# Patient Record
Sex: FEMALE | Employment: OTHER | URBAN - METROPOLITAN AREA
[De-identification: names, ages, dates, MRNs, and addresses within clinical notes are randomized per-mention and may not be internally consistent; named-entity substitution may affect disease eponyms.]

---

## 2024-07-03 ENCOUNTER — OFFICE VISIT (OUTPATIENT)
Dept: OPHTHALMOLOGY | Facility: CLINIC | Age: 69
End: 2024-07-03
Attending: OPHTHALMOLOGY
Payer: MEDICARE

## 2024-07-03 DIAGNOSIS — H52.13 MYOPIA OF BOTH EYES: ICD-10-CM

## 2024-07-03 DIAGNOSIS — H25.813 COMBINED FORMS OF AGE-RELATED CATARACT OF BOTH EYES: Primary | ICD-10-CM

## 2024-07-03 DIAGNOSIS — H52.4 PRESBYOPIA: ICD-10-CM

## 2024-07-03 PROCEDURE — 76512 OPH US DX B-SCAN: CPT | Performed by: OPHTHALMOLOGY

## 2024-07-03 PROCEDURE — G0463 HOSPITAL OUTPT CLINIC VISIT: HCPCS | Performed by: OPHTHALMOLOGY

## 2024-07-03 PROCEDURE — 76519 ECHO EXAM OF EYE: CPT | Performed by: OPHTHALMOLOGY

## 2024-07-03 PROCEDURE — 99204 OFFICE O/P NEW MOD 45 MIN: CPT | Performed by: OPHTHALMOLOGY

## 2024-07-03 RX ORDER — DIPHENHYDRAMINE HCL 25 MG
CAPSULE ORAL
COMMUNITY
End: 2024-09-05

## 2024-07-03 RX ORDER — OMEGA-3/DHA/EPA/FISH OIL 60 MG-90MG
CAPSULE ORAL
COMMUNITY

## 2024-07-03 RX ORDER — ASPIRIN 325 MG
TABLET ORAL
COMMUNITY

## 2024-07-03 RX ORDER — IBUPROFEN 200 MG
TABLET ORAL
COMMUNITY

## 2024-07-03 ASSESSMENT — CONF VISUAL FIELD
OS_INFERIOR_TEMPORAL_RESTRICTION: 3
OS_SUPERIOR_TEMPORAL_RESTRICTION: 3
METHOD: COUNTING FINGERS
OD_NORMAL: 1
OS_SUPERIOR_NASAL_RESTRICTION: 3
OD_INFERIOR_TEMPORAL_RESTRICTION: 0
OD_SUPERIOR_TEMPORAL_RESTRICTION: 0
OS_INFERIOR_NASAL_RESTRICTION: 3
OD_SUPERIOR_NASAL_RESTRICTION: 0
OD_INFERIOR_NASAL_RESTRICTION: 0

## 2024-07-03 ASSESSMENT — REFRACTION_WEARINGRX
SPECS_TYPE: BIFOCAL
OS_SPHERE: +2.00
OS_AXIS: 051
OD_CYLINDER: +0.25
OD_SPHERE: +1.25
OS_CYLINDER: +0.25
OD_ADD: +2.75
OS_ADD: +2.75
OD_AXIS: 154

## 2024-07-03 ASSESSMENT — REFRACTION_MANIFEST
OS_ADD: +2.75
OS_SPHERE: PLANO
OD_SPHERE: -0.75
OS_CYLINDER: SPHERE
OD_ADD: +2.75
OD_CYLINDER: SPHERE

## 2024-07-03 ASSESSMENT — VISUAL ACUITY
OD_BAT_HIGH: <20/400
OS_BAT_HIGH: <20/400
OS_SC: CF @ 1'
OD_SC: 20/40
OD_SC+: -2
METHOD: SNELLEN - LINEAR

## 2024-07-03 ASSESSMENT — SLIT LAMP EXAM - LIDS
COMMENTS: NORMAL
COMMENTS: NORMAL

## 2024-07-03 ASSESSMENT — TONOMETRY
OD_IOP_MMHG: 19
OS_IOP_MMHG: 18
IOP_METHOD: ICARE

## 2024-07-03 ASSESSMENT — CUP TO DISC RATIO: OD_RATIO: 0.5

## 2024-07-03 NOTE — PROGRESS NOTES
HPI       Cataract Evaluation    In both eyes.  Associated symptoms include blurred vision, glare and a need for brighter lights.  Onset was gradual.  Context:  reading, night driving and dim lighting.  Since onset it is gradually worsening.  Treatments tried include artificial tears.             Comments    Here for cataracts evaluation. Last eye exam was about 1.5 years ago. VA has worsened since exam in both eyes, left>right - cloudy and hazy vision. Glare and night driving is bothersome. Uses AT as needed. No flashes or floaters.    Norberto Iyer COT 8:16 AM July 3, 2024     No ocular sx          Last edited by Norberto Iyer on 7/3/2024  8:16 AM.         Review of systems for the eyes was negative other than the pertinent positives/negatives listed in the HPI.      Assessment & Plan    HPI:  Elaina Geiger is a 69 year old female with no significant past medical history presents with decreased vision left eye x at least 2 years, left eye > right eye. She was told that has cataracts from [facility]. She notes difficulty with night driving and glare.       POHx: denies  PMHx: denies  Current Medications:   Current Outpatient Medications   Medication Sig Dispense Refill    aspirin (ASA) 325 MG tablet take 2 tablets (650 mg) by oral route every 6 hours as needed      diphenhydrAMINE (BENADRYL ALLERGY) 25 MG capsule take 1 capsule (25 mg) by oral route every 4 hours as needed      fish oil-omega-3 fatty acids 500 MG capsule       ibuprofen (ADVIL) 200 MG tablet take 1 tablet (200 mg) by oral route every 6 hours as needed with food       No current facility-administered medications for this visit.     FHx: denies family history of ocular conditions   PSHx: denies history of ocular surgeries       Current Eye Medications:      Assessment & Plan:  (H25.813) Combined forms of age-related cataract of both eyes    Special equipment/needs:  Eye: left  Anesthesia:topical  Dilates to: 6  Iris expansion:  Unlikely   Pseudoexfoliation:  No  Trypan Blue: Yes  Trauma: No    Able lay to flat: Yes  Blood Thinner: Yes ASA 81  Tamsulosin: No  DM: No  Guttae: No    Dominant Eye: right    Plan: [place] both eyes   Cataract is believed to be significantly contributing to patient's visual impairment. Cataract surgery recommended and expected to improve vision. Vision is not correctable by glasses or other nonsurgical measures. R/B/A were discussed with the patient. These included, but are not limited to: bleeding, infection, loss of vision, loss of the eye, need for more surgery, glaucoma, retinal detachment, need for glasses, corneal edema. The patient voiced understanding and wishes to proceed. All lens options were discussed with the patient including monofocal lenses, multifocal lenses, and toric lenses. The risks and benefits of each were discussed, including halos, glare, and possible need for glasses. No guarantees about vision after surgery were given. The patient voiced understanding and wishes to proceed    Proceed with CE/IOL left eye followed by right eye .  Resident involvement discussed  Possible MSICS    Deep set orbit    (H52.13) Myopia of both eyes  (H52.4) Presbyopia  Hold pending Cataract extraction.intraocular lens       Return for POD0.        Jah Waite MD     Attending Physician Attestation:  Complete documentation of historical and exam elements from today's encounter can be found in the full encounter summary report (not reduplicated in this progress note).  I personally obtained the chief complaint(s) and history of present illness.  I confirmed and edited as necessary the review of systems, past medical/surgical history, family history, social history, and examination findings as documented by others; and I examined the patient myself.  I personally reviewed the relevant tests, images, and reports as documented above.  I formulated and edited as necessary the assessment and plan and discussed the findings and management plan  with the patient and family. - Jah Waite MD

## 2024-07-05 ENCOUNTER — TELEPHONE (OUTPATIENT)
Dept: OPHTHALMOLOGY | Facility: CLINIC | Age: 69
End: 2024-07-05
Payer: MEDICARE

## 2024-07-05 PROBLEM — H25.813 COMBINED FORMS OF AGE-RELATED CATARACT OF BOTH EYES: Status: ACTIVE | Noted: 2024-07-03

## 2024-07-05 NOTE — TELEPHONE ENCOUNTER
Called patient to schedule surgery with     Spoke with: Elaina    Date of Surgery: 9/5 (Right) and 9/19 (Left)     Patient aware of approximate arrival time: No aware RN will reach out to discuss     Location of surgery: [facility] (Right[facility] and [facility] (Left)     Pre-Op H&P: [facility] in [facility]     Informed patient that they need to call to schedule pre-op H&P with PCP within 30 days of surgery date: Yes    Post-Op Appt Dates:     9/11 900  9/25 1015  10/16 900     Discussed with patient pre-op RN will call 2-3 days prior to surgery with arrival time and instructions:  Yes    Discussed with patient PAC RN will provide arrival time and instructions for surgery at the time of the appointment: [[place] locations only]: No      Standard Surgery Packet Sent: Yes 07/05/24  via Mail - Standard      Surgical Soap Discussed with Patient: Yes  - Received:  Local Pharmacy       Additional Information Sent in Packet: Post-op Appointment Itinerary      Informed patient that they will need an adult  to bring patient home from surgery: Yes  : Rosemary         Additional Comments:        All patients questions were answered and was instructed to review surgical packet and call back [phone] with any questions or concerns.       Anna C. Schoenecker on 7/5/2024 at 8:22 AM

## 2024-07-09 NOTE — TELEPHONE ENCOUNTER
Patient called and stated that her pre op physical is scheduled for 8/26 at [facility].  Cynthia Delgado on 7/9/2024 at 8:52 AM

## 2024-08-19 NOTE — TELEPHONE ENCOUNTER
Patient called in, they have not rec'd their packet yet. Writer apologized and let them know that they would send a packet to them today. Writer confirmed address and sent packet. Felisa Mosqueda on 8/19/2024 at 8:15 AM

## 2024-09-05 ENCOUNTER — OFFICE VISIT (OUTPATIENT)
Dept: OPHTHALMOLOGY | Facility: CLINIC | Age: 69
End: 2024-09-05

## 2024-09-05 ENCOUNTER — HOSPITAL ENCOUNTER (OUTPATIENT)
Facility: AMBULATORY SURGERY CENTER | Age: 69
Discharge: HOME | End: 2024-09-05
Attending: OPHTHALMOLOGY | Admitting: OPHTHALMOLOGY
Payer: MEDICARE

## 2024-09-05 VITALS
RESPIRATION RATE: 16 BRPM | HEART RATE: 71 BPM | TEMPERATURE: 97.6 F | BODY MASS INDEX: 21.6 KG/M2 | OXYGEN SATURATION: 95 % | HEIGHT: 60 IN | DIASTOLIC BLOOD PRESSURE: 85 MMHG | SYSTOLIC BLOOD PRESSURE: 141 MMHG | WEIGHT: 110 LBS

## 2024-09-05 DIAGNOSIS — Z98.890 POSTOPERATIVE EYE STATE: Primary | ICD-10-CM

## 2024-09-05 DIAGNOSIS — H25.812 COMBINED FORM OF AGE-RELATED CATARACT, LEFT EYE: Primary | ICD-10-CM

## 2024-09-05 PROCEDURE — 66984 XCAPSL CTRC RMVL W/O ECP: CPT | Mod: LT

## 2024-09-05 PROCEDURE — 99024 POSTOP FOLLOW-UP VISIT: CPT | Mod: GC | Performed by: OPHTHALMOLOGY

## 2024-09-05 PROCEDURE — 66984 XCAPSL CTRC RMVL W/O ECP: CPT | Mod: LT | Performed by: OPHTHALMOLOGY

## 2024-09-05 DEVICE — LENS CC60WF 22.0 CLAREON UV ASPHERIC BICONVEX IOL: Type: IMPLANTABLE DEVICE | Site: EYE | Status: FUNCTIONAL

## 2024-09-05 RX ORDER — TETRACAINE HYDROCHLORIDE 5 MG/ML
SOLUTION OPHTHALMIC PRN
Status: DISCONTINUED | OUTPATIENT
Start: 2024-09-05 | End: 2024-09-05 | Stop reason: HOSPADM

## 2024-09-05 RX ORDER — ACETAZOLAMIDE 500 MG/5ML
500 INJECTION, POWDER, LYOPHILIZED, FOR SOLUTION INTRAVENOUS ONCE
Status: COMPLETED | OUTPATIENT
Start: 2024-09-05 | End: 2024-09-05

## 2024-09-05 RX ORDER — DEXAMETHASONE SODIUM PHOSPHATE 10 MG/ML
4 INJECTION, SOLUTION INTRAMUSCULAR; INTRAVENOUS
Status: DISCONTINUED | OUTPATIENT
Start: 2024-09-05 | End: 2024-09-06 | Stop reason: HOSPADM

## 2024-09-05 RX ORDER — PROPARACAINE HYDROCHLORIDE 5 MG/ML
1 SOLUTION/ DROPS OPHTHALMIC ONCE
Status: COMPLETED | OUTPATIENT
Start: 2024-09-05 | End: 2024-09-05

## 2024-09-05 RX ORDER — DICLOFENAC SODIUM 1 MG/ML
1 SOLUTION/ DROPS OPHTHALMIC
Status: COMPLETED | OUTPATIENT
Start: 2024-09-05 | End: 2024-09-05

## 2024-09-05 RX ORDER — ONDANSETRON 4 MG/1
4 TABLET, ORALLY DISINTEGRATING ORAL EVERY 30 MIN PRN
Status: DISCONTINUED | OUTPATIENT
Start: 2024-09-05 | End: 2024-09-06 | Stop reason: HOSPADM

## 2024-09-05 RX ORDER — FENTANYL CITRATE 50 UG/ML
25 INJECTION, SOLUTION INTRAMUSCULAR; INTRAVENOUS EVERY 5 MIN PRN
Status: DISCONTINUED | OUTPATIENT
Start: 2024-09-05 | End: 2024-09-06 | Stop reason: HOSPADM

## 2024-09-05 RX ORDER — FENTANYL CITRATE 50 UG/ML
50 INJECTION, SOLUTION INTRAMUSCULAR; INTRAVENOUS EVERY 5 MIN PRN
Status: DISCONTINUED | OUTPATIENT
Start: 2024-09-05 | End: 2024-09-06 | Stop reason: HOSPADM

## 2024-09-05 RX ORDER — PROPARACAINE HYDROCHLORIDE 5 MG/ML
1 SOLUTION/ DROPS OPHTHALMIC
Status: DISCONTINUED | OUTPATIENT
Start: 2024-09-05 | End: 2024-09-06 | Stop reason: HOSPADM

## 2024-09-05 RX ORDER — SODIUM CHLORIDE, SODIUM LACTATE, POTASSIUM CHLORIDE, CALCIUM CHLORIDE 600; 310; 30; 20 MG/100ML; MG/100ML; MG/100ML; MG/100ML
INJECTION, SOLUTION INTRAVENOUS CONTINUOUS
Status: CANCELLED | OUTPATIENT
Start: 2024-09-05

## 2024-09-05 RX ORDER — ONDANSETRON 2 MG/ML
4 INJECTION INTRAMUSCULAR; INTRAVENOUS EVERY 30 MIN PRN
Status: DISCONTINUED | OUTPATIENT
Start: 2024-09-05 | End: 2024-09-06 | Stop reason: HOSPADM

## 2024-09-05 RX ORDER — MOXIFLOXACIN 5 MG/ML
1 SOLUTION/ DROPS OPHTHALMIC
Status: COMPLETED | OUTPATIENT
Start: 2024-09-05 | End: 2024-09-05

## 2024-09-05 RX ORDER — CYCLOPENTOLAT/TROPIC/PHENYLEPH 1%-1%-2.5%
1 DROPS (EA) OPHTHALMIC (EYE)
Status: COMPLETED | OUTPATIENT
Start: 2024-09-05 | End: 2024-09-05

## 2024-09-05 RX ORDER — TIMOLOL MALEATE 5 MG/ML
SOLUTION/ DROPS OPHTHALMIC PRN
Status: DISCONTINUED | OUTPATIENT
Start: 2024-09-05 | End: 2024-09-05 | Stop reason: HOSPADM

## 2024-09-05 RX ORDER — OXYCODONE HYDROCHLORIDE 5 MG/1
10 TABLET ORAL
Status: DISCONTINUED | OUTPATIENT
Start: 2024-09-05 | End: 2024-09-06 | Stop reason: HOSPADM

## 2024-09-05 RX ORDER — LIDOCAINE HYDROCHLORIDE 10 MG/ML
INJECTION, SOLUTION EPIDURAL; INFILTRATION; INTRACAUDAL; PERINEURAL PRN
Status: DISCONTINUED | OUTPATIENT
Start: 2024-09-05 | End: 2024-09-05 | Stop reason: HOSPADM

## 2024-09-05 RX ORDER — NALOXONE HYDROCHLORIDE 0.4 MG/ML
0.1 INJECTION, SOLUTION INTRAMUSCULAR; INTRAVENOUS; SUBCUTANEOUS
Status: DISCONTINUED | OUTPATIENT
Start: 2024-09-05 | End: 2024-09-06 | Stop reason: HOSPADM

## 2024-09-05 RX ORDER — LABETALOL HYDROCHLORIDE 5 MG/ML
10 INJECTION, SOLUTION INTRAVENOUS
Status: DISCONTINUED | OUTPATIENT
Start: 2024-09-05 | End: 2024-09-06 | Stop reason: HOSPADM

## 2024-09-05 RX ORDER — HYDROMORPHONE HYDROCHLORIDE 1 MG/ML
0.2 INJECTION, SOLUTION INTRAMUSCULAR; INTRAVENOUS; SUBCUTANEOUS EVERY 5 MIN PRN
Status: DISCONTINUED | OUTPATIENT
Start: 2024-09-05 | End: 2024-09-06 | Stop reason: HOSPADM

## 2024-09-05 RX ORDER — SODIUM CHLORIDE, SODIUM LACTATE, POTASSIUM CHLORIDE, CALCIUM CHLORIDE 600; 310; 30; 20 MG/100ML; MG/100ML; MG/100ML; MG/100ML
INJECTION, SOLUTION INTRAVENOUS CONTINUOUS
Status: DISCONTINUED | OUTPATIENT
Start: 2024-09-05 | End: 2024-09-06 | Stop reason: HOSPADM

## 2024-09-05 RX ORDER — BALANCED SALT SOLUTION 6.4; .75; .48; .3; 3.9; 1.7 MG/ML; MG/ML; MG/ML; MG/ML; MG/ML; MG/ML
SOLUTION OPHTHALMIC PRN
Status: DISCONTINUED | OUTPATIENT
Start: 2024-09-05 | End: 2024-09-05 | Stop reason: HOSPADM

## 2024-09-05 RX ORDER — HYDROMORPHONE HYDROCHLORIDE 1 MG/ML
0.4 INJECTION, SOLUTION INTRAMUSCULAR; INTRAVENOUS; SUBCUTANEOUS EVERY 5 MIN PRN
Status: DISCONTINUED | OUTPATIENT
Start: 2024-09-05 | End: 2024-09-06 | Stop reason: HOSPADM

## 2024-09-05 RX ORDER — MOXIFLOXACIN 5 MG/ML
1 SOLUTION/ DROPS OPHTHALMIC 4 TIMES DAILY
Qty: 3 ML | Refills: 0 | Status: SHIPPED | OUTPATIENT
Start: 2024-09-05 | End: 2024-09-05

## 2024-09-05 RX ORDER — OFLOXACIN 3 MG/ML
1-2 SOLUTION/ DROPS OPHTHALMIC 4 TIMES DAILY
Qty: 5 ML | Refills: 0 | Status: SHIPPED | OUTPATIENT
Start: 2024-09-05

## 2024-09-05 RX ORDER — MOXIFLOXACIN IN NACL,ISO-OS/PF 0.3MG/0.3
SYRINGE (ML) INTRAOCULAR PRN
Status: DISCONTINUED | OUTPATIENT
Start: 2024-09-05 | End: 2024-09-05 | Stop reason: HOSPADM

## 2024-09-05 RX ORDER — PREDNISOLONE ACETATE 10 MG/ML
1-2 SUSPENSION/ DROPS OPHTHALMIC 4 TIMES DAILY
Qty: 10 ML | Refills: 0 | Status: SHIPPED | OUTPATIENT
Start: 2024-09-05

## 2024-09-05 RX ORDER — KETOROLAC TROMETHAMINE 5 MG/ML
1 SOLUTION OPHTHALMIC 4 TIMES DAILY
Qty: 10 ML | Refills: 0 | Status: SHIPPED | OUTPATIENT
Start: 2024-09-05

## 2024-09-05 RX ORDER — ACETAMINOPHEN 325 MG/1
975 TABLET ORAL ONCE
Status: COMPLETED | OUTPATIENT
Start: 2024-09-05 | End: 2024-09-05

## 2024-09-05 RX ORDER — LIDOCAINE 40 MG/G
CREAM TOPICAL
Status: DISCONTINUED | OUTPATIENT
Start: 2024-09-05 | End: 2024-09-06 | Stop reason: HOSPADM

## 2024-09-05 RX ORDER — OXYCODONE HYDROCHLORIDE 5 MG/1
5 TABLET ORAL
Status: DISCONTINUED | OUTPATIENT
Start: 2024-09-05 | End: 2024-09-06 | Stop reason: HOSPADM

## 2024-09-05 RX ADMIN — ACETAZOLAMIDE 500 MG: 500 INJECTION, POWDER, LYOPHILIZED, FOR SOLUTION INTRAVENOUS at 09:53

## 2024-09-05 RX ADMIN — ACETAMINOPHEN 975 MG: 325 TABLET ORAL at 09:44

## 2024-09-05 RX ADMIN — DICLOFENAC SODIUM 1 DROP: 1 SOLUTION/ DROPS OPHTHALMIC at 09:43

## 2024-09-05 RX ADMIN — MOXIFLOXACIN 1 DROP: 5 SOLUTION/ DROPS OPHTHALMIC at 09:43

## 2024-09-05 RX ADMIN — DICLOFENAC SODIUM 1 DROP: 1 SOLUTION/ DROPS OPHTHALMIC at 09:27

## 2024-09-05 RX ADMIN — DICLOFENAC SODIUM 1 DROP: 1 SOLUTION/ DROPS OPHTHALMIC at 09:34

## 2024-09-05 RX ADMIN — SODIUM CHLORIDE, SODIUM LACTATE, POTASSIUM CHLORIDE, CALCIUM CHLORIDE: 600; 310; 30; 20 INJECTION, SOLUTION INTRAVENOUS at 09:45

## 2024-09-05 RX ADMIN — MOXIFLOXACIN 1 DROP: 5 SOLUTION/ DROPS OPHTHALMIC at 09:27

## 2024-09-05 RX ADMIN — Medication 1 DROP: at 09:34

## 2024-09-05 RX ADMIN — Medication 1 DROP: at 09:27

## 2024-09-05 RX ADMIN — MOXIFLOXACIN 1 DROP: 5 SOLUTION/ DROPS OPHTHALMIC at 09:34

## 2024-09-05 RX ADMIN — Medication 1 DROP: at 09:43

## 2024-09-05 RX ADMIN — PROPARACAINE HYDROCHLORIDE 1 DROP: 5 SOLUTION/ DROPS OPHTHALMIC at 09:27

## 2024-09-05 ASSESSMENT — EXTERNAL EXAM - LEFT EYE: OS_EXAM: NORMAL

## 2024-09-05 ASSESSMENT — TONOMETRY
IOP_METHOD: TONOPEN
OS_IOP_MMHG: 12

## 2024-09-05 ASSESSMENT — VISUAL ACUITY
OS_SC: 20/50
METHOD: SNELLEN - LINEAR

## 2024-09-05 ASSESSMENT — SLIT LAMP EXAM - LIDS: COMMENTS: NORMAL

## 2024-09-05 NOTE — DISCHARGE INSTRUCTIONS
"[facility]  Home Care Following Your Procedure  Call a doctor if you have signs of infection (fever, growing tenderness at the surgery site, a large amount of drainage or bleeding, severe pain, foul-smelling drainage, redness, swelling).  Today you received a Marcaine or bupivacaine block to numb the nerves near your surgery site.  This is a block using local anesthetic or \"numbing\" medication injected around the nerves to anesthetize or \"numb\" the area supplied by those nerves.  This block is injected into the muscle layer near your surgical site.  The medication may numb the location where you had surgery for 6-18 hours, but may last up to 24 hours.  If your surgical site is an arm or leg you should be careful with your affected limb, since it is possible to injure your limb without being aware of it due to the numbing.  Until full feeling returns, you should guard against bumping or hitting your limb, and avoid extreme hot or cold temperatures on the skin.  As the block wears off, the feeling will return as a tingling or prickly sensation near your surgical site.  You will experience more discomfort from your incision as the feeling returns.  You may want to take a pain pill (a narcotic or Tylenol if this was prescribed by your surgeon) when you start to experience mild pain before the pain becomes more severe.  If your pain medications do not control your pain you should notifiy your surgeon.        Tylenol/Acetaminophen Consumption    If you feel your pain relief is insufficient, you may take Tylenol/Acetaminophen in addition to your narcotic pain medication.   Be careful not to exceed 4,000 mg of Tylenol/Acetaminophen in a 24 hour period from all sources.  If you are taking extra strength Tylenol/acetaminophen (500 mg), the maximum dose is 8 tablets in 24 hours.  If you are taking regular strength acetaminophen (325 mg), the maximum dose is 12 tablets in 24 " hours.      Your doctor is:  Dr. Jah Waite, Ophthalmology: [phone]             Or dial [phone] and ask for the resident on call for:  Ophthalmology  For emergency care, call the:  [place]:  [phone] (TTY for hearing impaired: [phone])

## 2024-09-05 NOTE — OP NOTE
PREOPERATIVE DIAGNOSIS:   1. Combined form of age-related cataract, left eye     Left eye   POSTOPERATIVE DIAGNOSIS: Same   PROCEDURES:   1. Cataract extraction with intraocular lens implant Left eye.  SURGEON: Jah Waite M.D.  ASSISTANT: Marc Montemayor MD  INDICATIONS: The patient Elaina Geiger presented to the eye clinic with decreased vision secondary to cataract in the Left eye. The risks, benefits and alternatives to cataract extraction were discussed. The patient elected to proceed. All questions were answered to the patient's satisfaction.   DESCRIPTION OF PROCEDURE:   Prior to the procedure, appropriate cardiac and respiratory monitors were applied to the patient.  The patient was brought to the operating room. A drop of topical tetracaine was placed in the operative eye.  With adequate anesthesia, the Left eye was prepped and draped in the usual sterile fashion. A lid speculum was placed, and the operating microscope was rotated into position. A surgical pause was carried out to identify with all members of the surgical team the correct surgical site. A paracentesis was created.  Through this limbal paracentesis, the anterior chamber was filled with preservative-free lidocaine followed by Trypan for 30 seconds and rinsed with balanced salt solution. The eye was viscoelastic.  A temporal wound was created at the limbus using a 2.6 mm blade. A capsulorrhexis was initiated using a bent 25-gauge needle and was completed in continuous and circular fashion using the capsulorrhexis forceps. The lens nucleus was hydrodissected using balanced salt solution.  The lens nucleus was rotated and removed using phacoemulsification in a stop and chop technique.  Residual cortical material was removed using irrigation-aspiration.  The capsular bag was reinflated to its maximal extent with cohesive viscoelastic.  A 22.0 diopter CC60WF was inserted into the capsular bag.  The lens power selected was reviewed using  the intraocular lens power measurements that were obtained preoperatively to confirm that the correct lens was selected for the desired post-operative refractive state. The residual viscoelastic was removed in its entirety, the wounds were hydrated and found to be self-sealing.  Intracameral moxifloxacin was administered. Tactile pressure was confirmed to be in a normal range.  The lid speculum was removed and a shield was applied.  The patient tolerated the procedure well, and there were no complications.    PLAN: The patient will be discharged to home and will follow up today  EBL:  None  Complications:  None  Implant Name Type Inv. Item Serial No.  Lot No. LRB No. Used Action   LENS CC60WF 22.0 CLARE[employer] UV ASPHERIC BICONVEX IOL - [id number] Lens/Eye Implant LENS CC60WF 22.0 CLAREON UV ASPHERIC BICONVEX IOL [id number] [employer]  Left 1 Implanted      Attending Physician Procedure Attestation: I was present for the entire procedure and was available to assist as needed-Jah Waite MD

## 2024-09-11 ENCOUNTER — OFFICE VISIT (OUTPATIENT)
Dept: OPHTHALMOLOGY | Facility: CLINIC | Age: 69
End: 2024-09-11
Attending: OPHTHALMOLOGY
Payer: MEDICARE

## 2024-09-11 DIAGNOSIS — Z96.1 PSEUDOPHAKIA, LEFT EYE: ICD-10-CM

## 2024-09-11 DIAGNOSIS — H18.20 CORNEAL EDEMA: Primary | ICD-10-CM

## 2024-09-11 PROCEDURE — 99024 POSTOP FOLLOW-UP VISIT: CPT | Performed by: OPHTHALMOLOGY

## 2024-09-11 PROCEDURE — G0463 HOSPITAL OUTPT CLINIC VISIT: HCPCS | Performed by: OPHTHALMOLOGY

## 2024-09-11 RX ORDER — SILICONE ADHESIVE 1.5" X 3"
1-2 SHEET (EA) TOPICAL 4 TIMES DAILY
Qty: 15 ML | Refills: 1 | Status: SHIPPED | OUTPATIENT
Start: 2024-09-11

## 2024-09-11 ASSESSMENT — TONOMETRY
OS_IOP_MMHG: 18
OD_IOP_MMHG: 15
IOP_METHOD: ICARE

## 2024-09-11 ASSESSMENT — CUP TO DISC RATIO: OS_RATIO: 0.4

## 2024-09-11 ASSESSMENT — VISUAL ACUITY
METHOD: SNELLEN - LINEAR
METHOD_MR_RETINOSCOPY: 1
OD_CC: 20/40
OS_CC: 20/125
OD_CC+: -2

## 2024-09-11 ASSESSMENT — SLIT LAMP EXAM - LIDS
COMMENTS: NORMAL
COMMENTS: NORMAL

## 2024-09-11 ASSESSMENT — REFRACTION_MANIFEST
OS_CYLINDER: SPHERE
OS_SPHERE: -0.50

## 2024-09-11 NOTE — PROGRESS NOTES
HPI       Post Op (Ophthalmology) Left Eye    In left eye.  Since onset it is stable.  Associated symptoms include Negative for eye pain, flashes and floaters.  Treatments tried include eye drops.             Comments    Here for 1 week post op. VA doing okay. No pain. No flashes or floaters.     Norberto Iyer COT 8:48 AM September 11, 2024             Last edited by Norberto Iyer on 9/11/2024  8:48 AM.         Review of systems for the eyes was negative other than the pertinent positives/negatives listed in the HPI.      Assessment & Plan    HPI:  Elaina Geiger is a 69 year old female with no significant past medical history presents for POW1 left eye        POHx: denies  PMHx: denies  Current Medications:   Current Outpatient Medications   Medication Sig Dispense Refill    sodium chloride () 5 % ophthalmic solution Place 1-2 drops Into the left eye 4 times daily. 15 mL 1    aspirin (ASA) 325 MG tablet take 2 tablets (650 mg) by oral route every 6 hours as needed      fish oil-omega-3 fatty acids 500 MG capsule       ibuprofen (ADVIL) 200 MG tablet take 1 tablet (200 mg) by oral route every 6 hours as needed with food      ketorolac (ACULAR) 0.5 % ophthalmic solution Place 1 drop Into the left eye 4 times daily. Start 2 days prior to surgery four times a day, after surgery: Four times a day x 1 week, three times a day x 1 week, twice a day x 1 week, daily x 1 week then stop 10 mL 0    ofloxacin (OCUFLOX) 0.3 % ophthalmic solution Place 1-2 drops Into the left eye 4 times daily. 5 mL 0    prednisoLONE acetate (PRED FORTE) 1 % ophthalmic suspension Place 1-2 drops Into the left eye 4 times daily. After surgery: Four times a day x 1 week, three times a day x 1 week, twice a day x 1 week, daily x 1 week then stop 10 mL 0     No current facility-administered medications for this visit.     FHx: denies family history of ocular conditions   PSHx: Cataract extraction/intraocular lens Waite left eye 9/5/24      Current  Eye Medications:  Moxifloxacin/pred forte/ketorolac four times a day       Assessment & Plan:  Pseudophakia, left eye  [place] left eye 09/11/24   Doing well    (H25.813) Combined forms of age-related cataract of both eyes    Special equipment/needs:  Eye: right  Anesthesia: topical  Dilates to: 6  Iris expansion:  Unlikely   Pseudoexfoliation: No  Trypan Blue: Yes  Trauma: No    Able lay to flat: Yes  Blood Thinner: Yes ASA 81  Tamsulosin: No  DM: No  Guttae: No    Dominant Eye: right    Plan: [place] both eyes     Proceed with CE/IOL right eye   Resident involvement discussed    Deep set orbit    (H52.13) Myopia of both eyes  (H52.4) Presbyopia  Hold pending Cataract extraction.intraocular lens       Return for as scheduled.        Jah Waite MD     Attending Physician Attestation:  Complete documentation of historical and exam elements from today's encounter can be found in the full encounter summary report (not reduplicated in this progress note).  I personally obtained the chief complaint(s) and history of present illness.  I confirmed and edited as necessary the review of systems, past medical/surgical history, family history, social history, and examination findings as documented by others; and I examined the patient myself.  I personally reviewed the relevant tests, images, and reports as documented above.  I formulated and edited as necessary the assessment and plan and discussed the findings and management plan with the patient and family. - Jah Waite MD

## 2024-09-18 NOTE — PROGRESS NOTES
Pseudophakia, right eye, day 0  Waite OD 09/19/24   Doing well  Keep patch in place at night for 7 days  Start post-operative drops and taper according to instructions  Post-operative do's and don'ts reviewed, questions answered    Recheck 1 week    Rick Bee MD  PGY-3 Ophthalmology Resident  [facility]    Attending Physician Attestation:  Complete documentation of historical and exam elements from today's encounter can be found in the full encounter summary report (not reduplicated in this progress note).  I personally obtained the chief complaint(s) and history of present illness.  I confirmed and edited as necessary the review of systems, past medical/surgical history, family history, social history, and examination findings as documented by others; and I examined the patient myself.  I personally reviewed the relevant tests, images, and reports as documented above.  I formulated and edited as necessary the assessment and plan and discussed the findings and management plan with the patient and family.  - Jah Waite MD

## 2024-09-19 ENCOUNTER — HOSPITAL ENCOUNTER (OUTPATIENT)
Facility: AMBULATORY SURGERY CENTER | Age: 69
Discharge: HOME | End: 2024-09-19
Attending: OPHTHALMOLOGY | Admitting: OPHTHALMOLOGY
Payer: MEDICARE

## 2024-09-19 ENCOUNTER — OFFICE VISIT (OUTPATIENT)
Dept: OPHTHALMOLOGY | Facility: CLINIC | Age: 69
End: 2024-09-19

## 2024-09-19 VITALS
HEIGHT: 60 IN | TEMPERATURE: 97.9 F | DIASTOLIC BLOOD PRESSURE: 85 MMHG | OXYGEN SATURATION: 94 % | HEART RATE: 67 BPM | RESPIRATION RATE: 16 BRPM | WEIGHT: 110 LBS | BODY MASS INDEX: 21.6 KG/M2 | SYSTOLIC BLOOD PRESSURE: 147 MMHG

## 2024-09-19 DIAGNOSIS — Z98.890 POSTOPERATIVE EYE STATE: Primary | ICD-10-CM

## 2024-09-19 DIAGNOSIS — H25.811 COMBINED FORM OF AGE-RELATED CATARACT, RIGHT EYE: Primary | ICD-10-CM

## 2024-09-19 PROCEDURE — 99024 POSTOP FOLLOW-UP VISIT: CPT | Mod: GC | Performed by: OPHTHALMOLOGY

## 2024-09-19 PROCEDURE — 66984 XCAPSL CTRC RMVL W/O ECP: CPT | Mod: RT

## 2024-09-19 PROCEDURE — 66984 XCAPSL CTRC RMVL W/O ECP: CPT | Mod: 79 | Performed by: OPHTHALMOLOGY

## 2024-09-19 DEVICE — LENS CC60WF 22.5 CLAREON UV ASPHERIC BICONVEX IOL: Type: IMPLANTABLE DEVICE | Site: EYE | Status: FUNCTIONAL

## 2024-09-19 RX ORDER — TIMOLOL MALEATE 5 MG/ML
SOLUTION/ DROPS OPHTHALMIC PRN
Status: DISCONTINUED | OUTPATIENT
Start: 2024-09-19 | End: 2024-09-19 | Stop reason: HOSPADM

## 2024-09-19 RX ORDER — SODIUM CHLORIDE, SODIUM LACTATE, POTASSIUM CHLORIDE, CALCIUM CHLORIDE 600; 310; 30; 20 MG/100ML; MG/100ML; MG/100ML; MG/100ML
INJECTION, SOLUTION INTRAVENOUS CONTINUOUS
Status: DISCONTINUED | OUTPATIENT
Start: 2024-09-19 | End: 2024-09-20 | Stop reason: HOSPADM

## 2024-09-19 RX ORDER — CYCLOPENTOLAT/TROPIC/PHENYLEPH 1%-1%-2.5%
1 DROPS (EA) OPHTHALMIC (EYE)
Status: COMPLETED | OUTPATIENT
Start: 2024-09-19 | End: 2024-09-19

## 2024-09-19 RX ORDER — MOXIFLOXACIN 5 MG/ML
1 SOLUTION/ DROPS OPHTHALMIC 4 TIMES DAILY
Qty: 3 ML | Refills: 0 | Status: SHIPPED | OUTPATIENT
Start: 2024-09-19

## 2024-09-19 RX ORDER — PREDNISOLONE ACETATE 10 MG/ML
1-2 SUSPENSION/ DROPS OPHTHALMIC 4 TIMES DAILY
Qty: 10 ML | Refills: 0 | Status: SHIPPED | OUTPATIENT
Start: 2024-09-19

## 2024-09-19 RX ORDER — BALANCED SALT SOLUTION 6.4; .75; .48; .3; 3.9; 1.7 MG/ML; MG/ML; MG/ML; MG/ML; MG/ML; MG/ML
SOLUTION OPHTHALMIC PRN
Status: DISCONTINUED | OUTPATIENT
Start: 2024-09-19 | End: 2024-09-19 | Stop reason: HOSPADM

## 2024-09-19 RX ORDER — PROPARACAINE HYDROCHLORIDE 5 MG/ML
1 SOLUTION/ DROPS OPHTHALMIC ONCE
Status: COMPLETED | OUTPATIENT
Start: 2024-09-19 | End: 2024-09-19

## 2024-09-19 RX ORDER — MOXIFLOXACIN 5 MG/ML
1 SOLUTION/ DROPS OPHTHALMIC
Status: COMPLETED | OUTPATIENT
Start: 2024-09-19 | End: 2024-09-19

## 2024-09-19 RX ORDER — DICLOFENAC SODIUM 1 MG/ML
1 SOLUTION/ DROPS OPHTHALMIC
Status: COMPLETED | OUTPATIENT
Start: 2024-09-19 | End: 2024-09-19

## 2024-09-19 RX ORDER — MOXIFLOXACIN IN NACL,ISO-OS/PF 0.3MG/0.3
SYRINGE (ML) INTRAOCULAR PRN
Status: DISCONTINUED | OUTPATIENT
Start: 2024-09-19 | End: 2024-09-19 | Stop reason: HOSPADM

## 2024-09-19 RX ORDER — KETOROLAC TROMETHAMINE 5 MG/ML
1 SOLUTION OPHTHALMIC 4 TIMES DAILY
Qty: 10 ML | Refills: 0 | Status: SHIPPED | OUTPATIENT
Start: 2024-09-19

## 2024-09-19 RX ORDER — LIDOCAINE HYDROCHLORIDE 10 MG/ML
INJECTION, SOLUTION EPIDURAL; INFILTRATION; INTRACAUDAL; PERINEURAL PRN
Status: DISCONTINUED | OUTPATIENT
Start: 2024-09-19 | End: 2024-09-19 | Stop reason: HOSPADM

## 2024-09-19 RX ORDER — TETRACAINE HYDROCHLORIDE 5 MG/ML
SOLUTION OPHTHALMIC PRN
Status: DISCONTINUED | OUTPATIENT
Start: 2024-09-19 | End: 2024-09-19 | Stop reason: HOSPADM

## 2024-09-19 RX ADMIN — Medication 1 DROP: at 06:35

## 2024-09-19 RX ADMIN — DICLOFENAC SODIUM 1 DROP: 1 SOLUTION/ DROPS OPHTHALMIC at 06:38

## 2024-09-19 RX ADMIN — MOXIFLOXACIN 1 DROP: 5 SOLUTION/ DROPS OPHTHALMIC at 06:29

## 2024-09-19 RX ADMIN — DICLOFENAC SODIUM 1 DROP: 1 SOLUTION/ DROPS OPHTHALMIC at 06:29

## 2024-09-19 RX ADMIN — PROPARACAINE HYDROCHLORIDE 1 DROP: 5 SOLUTION/ DROPS OPHTHALMIC at 06:29

## 2024-09-19 RX ADMIN — Medication 1 DROP: at 06:38

## 2024-09-19 RX ADMIN — MOXIFLOXACIN 1 DROP: 5 SOLUTION/ DROPS OPHTHALMIC at 06:35

## 2024-09-19 RX ADMIN — Medication 1 DROP: at 06:29

## 2024-09-19 RX ADMIN — DICLOFENAC SODIUM 1 DROP: 1 SOLUTION/ DROPS OPHTHALMIC at 06:35

## 2024-09-19 RX ADMIN — MOXIFLOXACIN 1 DROP: 5 SOLUTION/ DROPS OPHTHALMIC at 06:38

## 2024-09-19 ASSESSMENT — SLIT LAMP EXAM - LIDS: COMMENTS: NORMAL

## 2024-09-19 ASSESSMENT — EXTERNAL EXAM - RIGHT EYE: OD_EXAM: NORMAL

## 2024-09-19 NOTE — DISCHARGE INSTRUCTIONS
"[facility]  Home Care Following Anesthesia  For 24 hours after surgery:  Get plenty of rest.  A responsible adult must stay with you for at least 24 hours after you leave the surgery center.  Do not drive or use heavy equipment.  If you have weakness or tingling, don't drive or use heavy equipment until this feeling goes away.   Do not drink alcohol.   Avoid strenuous or risky activities.  Ask for help when climbing stairs.  You may feel lightheaded.  IF so, sit for a few minutes before standing.  Have someone help you get up.   If you have nausea (feel sick to your stomach): Drink only clear liquids such as apple juice, ginger ale, broth or 7-Up.  Rest may also help.  Be sure to drink enough fluids.  Move to a regular diet as you feel able.   You may have a slight fever.  Call the doctor if your fever is over 100 F (37.7 C) (taken under the tongue) or lasts longer than 24 hours.  You may have a dry mouth, a sore throat, muscle aches or trouble sleeping. These should go away after 24 hours.  Do not make important or legal decisions.   It is recommended to avoid smoking.        Today you received a Marcaine or bupivacaine block to numb the nerves near your surgery site.  This is a block using local anesthetic or \"numbing\" medication injected around the nerves to anesthetize or \"numb\" the area supplied by those nerves.  This block is injected into the muscle layer near your surgical site.  The medication may numb the location where you had surgery for 6-18 hours, but may last up to 24 hours.  If your surgical site is an arm or leg you should be careful with your affected limb, since it is possible to injure your limb without being aware of it due to the numbing.  Until full feeling returns, you should guard against bumping or hitting your limb, and avoid extreme hot or cold temperatures on the skin.  As the block wears off, the feeling will return as a tingling or prickly " sensation near your surgical site.  You will experience more discomfort from your incision as the feeling returns.  You may want to take a pain pill (a narcotic or Tylenol if this was prescribed by your surgeon) when you start to experience mild pain before the pain beccomes more severe.  If your pain medications do not control your pain you should notifiy your surgeon.    Tips for taking pain medications  To get the best pain relief possible, remember these points:  Take pain medications as directed, before pain becomes severe.  Pain medication can upset your stomach: taking it with food may help.  Constipation is a common side effect of pain medication. Drink plenty of  fluids.  Eat foods high in fiber. Take a stool softener if recommended by your doctor or pharmacist.  Do not drink alcohol, drive or operate machinery while taking pain medications.  Ask about other ways to control pain, such as with heat, ice or relaxation.    Tylenol/Acetaminophen Consumption    If you feel your pain relief is insufficient, you may take Tylenol/Acetaminophen in addition to your narcotic pain medication.   Be careful not to exceed 4,000 mg of Tylenol/Acetaminophen in a 24 hour period from all sources.  If you are taking extra strength Tylenol/acetaminophen (500 mg), the maximum dose is 8 tablets in 24 hours.  If you are taking regular strength acetaminophen (325 mg), the maximum dose is 12 tablets in 24 hours.    Call a doctor for any of the following:  Signs of infection (fever, growing tenderness at the surgery site, a large amount of drainage or bleeding, severe pain, foul-smelling drainage, redness, swelling).  It has been over 8 to 10 hours since surgery and you are still not able to urinate (pass water).  Headache for over 24 hours.  Numbness, tingling or weakness the day after surgery (if you had spinal anesthesia).  Signs of Covid-19 infection (temperature over 100 degrees, shortness of breath, cough, loss of taste/smell,  generalized body aches, persistent headache, chills, sore throat, nausea/vomiting/diarrhea)  Your doctor is:  Dr. Jah Waite, Ophthalmology: [phone]                    Or dial [phone] and ask for the resident on call for:  Ophthalmology  For emergency care, call the:  [facility]:  [phone] (TTY for hearing impaired: [phone])

## 2024-09-19 NOTE — OP NOTE
PREOPERATIVE DIAGNOSIS:   1. Combined form of age-related cataract, right eye     Right eye   POSTOPERATIVE DIAGNOSIS: Same   PROCEDURES:   1. Cataract extraction with intraocular lens implant Right eye.  SURGEON: Jah Waite M.D.  ASSISTANT: Rick Bee MD PGY3  INDICATIONS: The patient Elaina Geiger presented to the eye clinic with decreased vision secondary to cataract in the Right eye. The risks, benefits and alternatives to cataract extraction were discussed. The patient elected to proceed. All questions were answered to the patient's satisfaction.   DESCRIPTION OF PROCEDURE:   Prior to the procedure, appropriate cardiac and respiratory monitors were applied to the patient.  The patient was brought to the operating room. A drop of topical tetracaine was placed in the operative eye.  With adequate anesthesia, the Right eye was prepped and draped in the usual sterile fashion. A lid speculum was placed, and the operating microscope was rotated into position. A surgical pause was carried out to identify with all members of the surgical team the correct surgical site. A paracentesis was created.  Through this limbal paracentesis, the anterior chamber was filled with preservative-free lidocaine followed by viscoelastic.  A temporal wound was created at the limbus using a 2.6 mm blade. A capsulorrhexis was initiated using a bent 25-gauge needle and was completed in continuous and circular fashion using the capsulorrhexis forceps. The lens nucleus was hydrodissected using balanced salt solution.  The lens nucleus was rotated and removed using phacoemulsification in a stop and chop technique.  Residual cortical material was removed using irrigation-aspiration.  The capsular bag was reinflated to its maximal extent with cohesive viscoelastic.  A 22.5 diopter CC60WF was inserted into the capsular bag.  The lens power selected was reviewed using the intraocular lens power measurements that were obtained  preoperatively to confirm that the correct lens was selected for the desired post-operative refractive state. The residual viscoelastic was removed in its entirety, the wounds were hydrated and found to be self-sealing.  Intracameral moxifloxacin was administered. Tactile pressure was confirmed to be in a normal range.  The lid speculum was removed, a drop of timolol was administered, and a shield was applied.  The patient tolerated the procedure well, and there were no complications.    PLAN: The patient will be discharged to home and will follow up today  EBL:  None  Complications:  None  Implant Name Type Inv. Item Serial No.  Lot No. LRB No. Used Action   LENS CC60WF 22.5 [employer] UV ASPHERIC BICONVEX IOL - [id number] Lens/Eye Implant LENS CC60WF 22.5 [employer] UV ASPHERIC BICONVEX IOL [id number] [employer]  Right 1 Implanted      Attending Physician Procedure Attestation: I was present for the entire procedure and was available to assist as needed-Jah Waite MD

## 2024-09-25 ENCOUNTER — OFFICE VISIT (OUTPATIENT)
Dept: OPHTHALMOLOGY | Facility: CLINIC | Age: 69
End: 2024-09-25
Attending: OPHTHALMOLOGY
Payer: MEDICARE

## 2024-09-25 DIAGNOSIS — Z96.1 PSEUDOPHAKIA, BOTH EYES: Primary | ICD-10-CM

## 2024-09-25 PROCEDURE — G0463 HOSPITAL OUTPT CLINIC VISIT: HCPCS | Performed by: OPHTHALMOLOGY

## 2024-09-25 PROCEDURE — 99024 POSTOP FOLLOW-UP VISIT: CPT | Performed by: OPHTHALMOLOGY

## 2024-09-25 ASSESSMENT — SLIT LAMP EXAM - LIDS
COMMENTS: NORMAL
COMMENTS: NORMAL

## 2024-09-25 ASSESSMENT — VISUAL ACUITY
METHOD: SNELLEN - LINEAR
OS_SC: 20/40
OS_PH_SC: 20/25
OS_SC+: +1
OS_PH_SC+: +2
OD_SC+: -1
OD_SC: 20/20

## 2024-09-25 ASSESSMENT — TONOMETRY
OD_IOP_MMHG: 18
OS_IOP_MMHG: 15
IOP_METHOD: ICARE

## 2024-09-25 NOTE — PROGRESS NOTES
HPI       Post Op (Ophthalmology) Both Eyes    Associated symptoms include Negative for eye pain.  Treatments tried include eye drops.  Pain was noted as 0/10. Additional comments: POW1 right eye POW3 left eye s/p CE/IOL each eye              Comments    Pt feels both eyes are healing well, no eye pain.   Overall vision improvement with both eyes.   Pt feels a film or mucous in each eye   Compliant with post operative eyedrops.     Johnnie Alvarado 9:22 AM September 25, 2024            Last edited by Johnnie Alvarado on 9/25/2024  9:23 AM.         Review of systems for the eyes was negative other than the pertinent positives/negatives listed in the HPI.      Assessment & Plan    HPI:  Elaina Geiger is a 69 year old female with no significant past medical history presents for POW1 right eye        POHx: denies  PMHx: denies  Current Medications:   Current Outpatient Medications   Medication Sig Dispense Refill    aspirin (ASA) 325 MG tablet take 2 tablets (650 mg) by oral route every 6 hours as needed      fish oil-omega-3 fatty acids 500 MG capsule       ibuprofen (ADVIL) 200 MG tablet take 1 tablet (200 mg) by oral route every 6 hours as needed with food      ketorolac (ACULAR) 0.5 % ophthalmic solution Place 1 drop into the right eye 4 times daily. Start 2 days prior to surgery four times a day, after surgery: Four times a day x 1 week, three times a day x 1 week, twice a day x 1 week, daily x 1 week then stop 10 mL 0    ketorolac (ACULAR) 0.5 % ophthalmic solution Place 1 drop Into the left eye 4 times daily. Start 2 days prior to surgery four times a day, after surgery: Four times a day x 1 week, three times a day x 1 week, twice a day x 1 week, daily x 1 week then stop 10 mL 0    moxifloxacin (VIGAMOX) 0.5 % ophthalmic solution Place 1 drop into the right eye 4 times daily. Start 2 days prior to surgery four times a day, after surgery: Four times a day x 1 week 3 mL 0    ofloxacin (OCUFLOX) 0.3 % ophthalmic solution Place 1-2  drops Into the left eye 4 times daily. 5 mL 0    prednisoLONE acetate (PRED FORTE) 1 % ophthalmic suspension Place 1-2 drops into the right eye 4 times daily. after surgery: Four times a day x 1 week, three times a day x 1 week, twice a day x 1 week, daily x 1 week then stop 10 mL 0    prednisoLONE acetate (PRED FORTE) 1 % ophthalmic suspension Place 1-2 drops Into the left eye 4 times daily. After surgery: Four times a day x 1 week, three times a day x 1 week, twice a day x 1 week, daily x 1 week then stop 10 mL 0    sodium chloride () 5 % ophthalmic solution Place 1-2 drops Into the left eye 4 times daily. 15 mL 1     No current facility-administered medications for this visit.     FHx: denies family history of ocular conditions   PSHx: Cataract extraction/intraocular lens Waite left eye 9/5/24, Waite right eye 09/19/24       Current Eye Medications:  Moxifloxacin/pred forte/ketorolac four times a day right eye   pred forte/ketorolac twice a day left eye     Assessment & Plan:  (Z96.1) Pseudophakia, both eyes    Waite left eye 09/11/24   Waite right eye 09/19/24   Continue drop taper  Will Likely benefit from YAG left eye in the future  Doing well      (H52.13) Myopia of both eyes  (H52.4) Presbyopia  Hold pending Cataract extraction.intraocular lens       Return for as scheduled.        Jah Waite MD     Attending Physician Attestation:  Complete documentation of historical and exam elements from today's encounter can be found in the full encounter summary report (not reduplicated in this progress note).  I personally obtained the chief complaint(s) and history of present illness.  I confirmed and edited as necessary the review of systems, past medical/surgical history, family history, social history, and examination findings as documented by others; and I examined the patient myself.  I personally reviewed the relevant tests, images, and reports as documented above.  I formulated  and edited as necessary the assessment and plan and discussed the findings and management plan with the patient and family. - Jah Waite MD

## 2024-09-25 NOTE — NURSING NOTE
Chief Complaints and History of Present Illnesses   Patient presents with    Post Op (Ophthalmology) Both Eyes     POW1 right eye POW3 left eye s/p CE/IOL each eye      Chief Complaint(s) and History of Present Illness(es)       Post Op (Ophthalmology) Both Eyes              Associated symptoms: Negative for eye pain    Treatments tried: eye drops    Pain scale: 0/10    Comments: POW1 right eye POW3 left eye s/p CE/IOL each eye               Comments    Pt feels both eyes are healing well, no eye pain.   Overall vision improvement with both eyes.   Pt feels a film or mucous in each eye   Compliant with post operative eyedrops.     Johnnie Alvarado 9:22 AM September 25, 2024

## (undated) DEVICE — ESU CORD BIPOLAR GREEN 10-4000

## (undated) DEVICE — SOL WATER IRRIG 500ML BOTTLE 2F7113

## (undated) DEVICE — EYE TIP IRRIGATION & ASPIRATION POLYMER 35D BENT 8065751511

## (undated) DEVICE — PACK CATARACT CUSTOM ASC SEY15CPUMC

## (undated) DEVICE — DRSG TEGADERM 4X4 3/4" 1626W

## (undated) DEVICE — DRAPE EYE SHEET 8441

## (undated) DEVICE — EYE KNIFE CRESCENT 55DEG 373807

## (undated) DEVICE — CATARACT BLADE PACK 2.6MM 58001899

## (undated) DEVICE — GLOVE BIOGEL PI MICRO SZ 7.5 48575

## (undated) DEVICE — EYE SHIELD PLASTIC

## (undated) DEVICE — LINEN TOWEL PACK X5 5464

## (undated) DEVICE — EYE PACK CUSTOM ANTERIOR 30DEG TIP CENTURION PPK6682-04

## (undated) DEVICE — SU ETHILON 10-0 CS160-6 12" 9000G

## (undated) DEVICE — EYE CANN IRR 25GA HYDRODISSECTING 585037

## (undated) DEVICE — EYE TIP IRRIGATION & ASPIRATION POLYMER CVD 0.3MM 8065751512

## (undated) RX ORDER — ACETAMINOPHEN 325 MG/1
TABLET ORAL
Status: DISPENSED
Start: 2024-09-05